# Patient Record
Sex: FEMALE | Race: WHITE
[De-identification: names, ages, dates, MRNs, and addresses within clinical notes are randomized per-mention and may not be internally consistent; named-entity substitution may affect disease eponyms.]

---

## 2018-02-01 ENCOUNTER — HOSPITAL ENCOUNTER (OUTPATIENT)
Dept: HOSPITAL 89 - US | Age: 68
End: 2018-02-01
Attending: NURSE PRACTITIONER
Payer: MEDICARE

## 2018-02-01 VITALS — BODY MASS INDEX: 29.89 KG/M2

## 2018-02-01 DIAGNOSIS — K76.0: ICD-10-CM

## 2018-02-01 DIAGNOSIS — K80.20: Primary | ICD-10-CM

## 2018-02-01 DIAGNOSIS — R19.8: ICD-10-CM

## 2018-02-01 PROCEDURE — 76700 US EXAM ABDOM COMPLETE: CPT

## 2018-02-01 NOTE — RADIOLOGY IMAGING REPORT
FACILITY: [facility] - [place] 

 

PATIENT NAME: Brittny Cervantes

: 1950

MR: [id number]

V: [id number]

EXAM DATE: 

ORDERING PHYSICIAN: EUSEBIA JO

TECHNOLOGIST: 

 

Location: [facility] [place]

Patient: Brittny Cervantes

: 1950

MRN: [id number]

Visit/Account:[id number]

Date of Sevice:  2018

 

ACCESSION #: [id number]

 

EXAMINATION:   Abdominal ultrasound complete

 

HISTORY:   Abdominal mass superficial, no pain

 

COMPARISON:   None.

 

FINDINGS:

 

Gallbladder: There are mobile shadowing stones seen within the gallbladder.  The gallbladder wall peralta
s not appear thickened and there is no demonstration of pericholecystic fluid.  There was a positive 
Baptiste sign by technologist notation

 

Liver: Increased echogenicity throughout the liver can be seen with fatty infiltration other infiltra
tive process

 

Common duct: Normal measuring 4.2 mm.

 

Pancreas: Head and body appear unremarkable.  Tail not well seen due to overlying bowel gas

 

Spleen:  Normal in size and echogenicity measuring 8.5 cm in length.

 

Kidneys:  Normal in size and echogenicity, the right measures 10 cm in length, and the left 10 cm.  N
o hydronephrosis.

 

Upper abdominal aorta and IVC: Negative.

 

Ascites:

 

In the location of patient's palpable abnormality along the upper anterior abdominal wall is a well-c
ircumscribed 1.7 x 2.3 x 0.9 cm echogenic nodule which likely represents a lipoma.

 

IMPRESSION:

Cholelithiasis and positive Baptiste sign by technologist notation.  No evidence of biliary ductal dila
tation

 

Increased echogenicity throughout liver which can be seen with fatty penetration other infiltrative p
rocess

 

1.7 x 2.3 x 0.9 cm well-circumscribed echogenic nodule along the upper intra-abdominal wall in the lo
cation of patient's palpable findings likely representing a lipoma

 

Report Dictated By: Carmen Nelson MD at 2018 11:31 AM

 

Report E-Signed By: Carmen Nelson MD  at 2018 11:35 AM

 

WSN:[id number]

## 2019-02-01 ENCOUNTER — HOSPITAL ENCOUNTER (OUTPATIENT)
Dept: HOSPITAL 89 - MRI | Age: 69
End: 2019-02-01
Attending: NURSE PRACTITIONER
Payer: MEDICARE

## 2019-02-01 VITALS — BODY MASS INDEX: 29.89 KG/M2

## 2019-02-01 DIAGNOSIS — M75.112: Primary | ICD-10-CM

## 2019-02-01 NOTE — RADIOLOGY IMAGING REPORT
FACILITY: [facility] 

 

PATIENT NAME: Brittny Cervantes

: 1950

MR: [id number]

V: [id number]

EXAM DATE: 

ORDERING PHYSICIAN: EUSEBIA JO

TECHNOLOGIST: 

 

Location: [facility] [place]

Patient: Brittny Cervantes

: 1950

MRN: [id number]

Visit/Account:[id number]

Date of Sevice:  2019

 

ACCESSION #: [id number]

 

MRI left shoulder without contrast

 

Indication: Left shoulder pain which has persisted for 6 months.

 

Comparison: None available

 

Technique: Multiplanar, multisequence MRI examination is performed of the left shoulder without contr
ast.

 

FINDINGS:

 

There is a type 1 acromion with a distal subacromial spur. There are mild to moderate changes of acro
mioclavicular joint osteoarthritis with a subchondral cyst seen within the distal clavicle. There is 
superior subluxation of the humeral head with respect to the glenoid. Mild changes of glenohumeral spencer
int osteoarthritis are seen. There is a small joint effusion. Central glenoid chondrosis is present o
f mild severity.

 

Examination of the rotator cuff demonstrates complete full-thickness tearing of the supraspinatus ten
don at its insertion with up to 2.0 cm of medial tendon retraction. There is moderate severity infras
pinatus insertional tendinopathy with intermediate grade undersurface insertional tearing at the ante
rior and mid insertion. No full-thickness extension is seen. There is mild subscapularis insertional 
tendinopathy with low-grade undersurface insertional tearing. The long head biceps tendon is seen wit
hin the bicipital groove. The tendon is well seen in the region of the rotator interval. This may ref
lect tendinopathy or at least partial-thickness tearing in this location. No definitive rupture.

 

There is a small amount of fluid within the subacromial-subdeltoid bursa. Rotator cuff musculature is
 normal in bulk. No atrophy seen.

 

IMPRESSION:

1. Complete full-thickness insertional tear of the left shoulder supraspinatus tendon with medial ten
don retraction.

2. Infraspinatus insertional tendinopathy with low to intermediate grade undersurface tearing.

3. Abnormal appearance of the long head biceps tendon most pronounced in the region of the rotator in
terval. Correlate for tendinopathy and partial-thickness tearing. No definite rupture.

4. Subscapularis tendinopathy with low-grade undersurface insertional tearing.

 

Report Dictated By: Adelso Nichols at 2019 1:38 PM

 

Report E-Signed By: Adelso Nichols  at 2019 1:46 PM

 

WSN:[id number]

## 2019-02-11 ENCOUNTER — HOSPITAL ENCOUNTER (OUTPATIENT)
Dept: HOSPITAL 89 - RESP | Age: 69
End: 2019-02-11
Attending: ANESTHESIOLOGY
Payer: MEDICARE

## 2019-02-11 VITALS — BODY MASS INDEX: 29.89 KG/M2

## 2019-02-11 DIAGNOSIS — Z01.810: ICD-10-CM

## 2019-02-11 DIAGNOSIS — E11.9: ICD-10-CM

## 2019-02-11 DIAGNOSIS — Z01.812: Primary | ICD-10-CM

## 2019-02-11 DIAGNOSIS — M75.102: ICD-10-CM

## 2019-02-11 PROCEDURE — 93005 ELECTROCARDIOGRAM TRACING: CPT

## 2019-02-11 NOTE — EKG
FACILITY: [facility] 

 

PATIENT NAME: SHIVAM WILKINSON

: [id number]

MR: [id number]

V: [id number]

EXAM DATE: 

ORDERING PHYSICIAN: LUKAS COTTRELL

TECHNOLOGIST: BITNER

 

Test Reason : PREOPSHOULDER

Blood Pressure : ***/*** mmHG

Vent. Rate : 070 BPM     Atrial Rate : 070 BPM

   P-R Int : 164 ms          QRS Dur : 074 ms

    QT Int : 366 ms       P-R-T Axes : 049 020 048 degrees

   QTc Int : 395 ms

 

Normal sinus rhythm

Normal ECG

 

Confirmed by LUKAS FENG ([id number]) on 2019 10:08:56 PM

 

Referred By:  RONIT           Confirmed By:LUKAS FENG